# Patient Record
Sex: FEMALE | URBAN - METROPOLITAN AREA
[De-identification: names, ages, dates, MRNs, and addresses within clinical notes are randomized per-mention and may not be internally consistent; named-entity substitution may affect disease eponyms.]

---

## 2018-07-25 ENCOUNTER — APPOINTMENT (OUTPATIENT)
Dept: URBAN - METROPOLITAN AREA CLINIC 186 | Age: 28
Setting detail: DERMATOLOGY
End: 2018-07-25

## 2018-07-25 DIAGNOSIS — L81.1 CHLOASMA: ICD-10-CM

## 2018-07-25 PROBLEM — L81.9 DISORDER OF PIGMENTATION, UNSPECIFIED: Status: ACTIVE | Noted: 2018-07-25

## 2018-07-25 PROCEDURE — OTHER SUNSCREEN RECOMMENDATIONS: OTHER

## 2018-07-25 PROCEDURE — OTHER WOOD'S LAMP: OTHER

## 2018-07-25 PROCEDURE — OTHER COUNSELING: OTHER

## 2018-07-25 PROCEDURE — OTHER PRESCRIPTION: OTHER

## 2018-07-25 PROCEDURE — OTHER DIAGNOSIS COMMENT: OTHER

## 2018-07-25 PROCEDURE — 99202 OFFICE O/P NEW SF 15 MIN: CPT

## 2018-07-25 RX ORDER — HYDROQUINONE 4 %
CREAM (GRAM) TOPICAL
Qty: 1 | Refills: 3 | Status: ERX | COMMUNITY
Start: 2018-07-25

## 2018-07-25 ASSESSMENT — LOCATION SIMPLE DESCRIPTION DERM
LOCATION SIMPLE: RIGHT CHEEK
LOCATION SIMPLE: LEFT FOREHEAD
LOCATION SIMPLE: LEFT CHEEK
LOCATION SIMPLE: RIGHT FOREHEAD

## 2018-07-25 ASSESSMENT — LOCATION DETAILED DESCRIPTION DERM
LOCATION DETAILED: RIGHT SUPERIOR LATERAL MALAR CHEEK
LOCATION DETAILED: LEFT SUPERIOR LATERAL MALAR CHEEK
LOCATION DETAILED: LEFT FOREHEAD
LOCATION DETAILED: RIGHT FOREHEAD

## 2018-07-25 ASSESSMENT — LOCATION ZONE DERM: LOCATION ZONE: FACE

## 2018-07-25 NOTE — HPI: RASH
How Severe Is Your Rash?: mild
Is This A New Presentation, Or A Follow-Up?: Rash
Additional History: Patient says she has noticed her face color has been becoming darker, says she was using Cetaphil lotion in the past but she has stopped since this started haooening

## 2018-07-25 NOTE — PROCEDURE: DIAGNOSIS COMMENT
Detail Level: Zone
Comment: Discussed with patient extremely subtle clinically Shay lamp shows some darkening as she described.  Denies pruritus. Denies preceding rash. Patient states becomes better in the sun.  Denies flaking. Has not used medication but does use sun screen. Discussed ddx with patient in regards to melasma vs post inflamnatory hyperpigmentation. Discussed history doesn’t fit well with either but clinically fits. Discussed observation vs treatment with hydroquinone and sunscreen \\nPatient prefers to try hydroquinone

## 2018-07-25 NOTE — PROCEDURE: WOOD'S LAMP
Detail Level: Zone
Wood's Lamp Text: A Wood's Lamp was used to illuminate areas of the skin with a black light. The light was held over the suspected areas in a darkened room.